# Patient Record
Sex: FEMALE
[De-identification: names, ages, dates, MRNs, and addresses within clinical notes are randomized per-mention and may not be internally consistent; named-entity substitution may affect disease eponyms.]

---

## 2018-01-22 ENCOUNTER — APPOINTMENT (OUTPATIENT)
Dept: GERIATRICS | Facility: CLINIC | Age: 83
End: 2018-01-22
Payer: MEDICARE

## 2018-01-22 VITALS
DIASTOLIC BLOOD PRESSURE: 70 MMHG | OXYGEN SATURATION: 98 % | RESPIRATION RATE: 18 BRPM | HEART RATE: 66 BPM | SYSTOLIC BLOOD PRESSURE: 112 MMHG | HEIGHT: 60 IN | BODY MASS INDEX: 26.5 KG/M2 | WEIGHT: 135 LBS

## 2018-01-22 DIAGNOSIS — S62.101A FRACTURE OF UNSPECIFIED CARPAL BONE, RIGHT WRIST, INITIAL ENCOUNTER FOR CLOSED FRACTURE: ICD-10-CM

## 2018-01-22 DIAGNOSIS — Z87.81 PERSONAL HISTORY OF (HEALED) TRAUMATIC FRACTURE: ICD-10-CM

## 2018-01-22 DIAGNOSIS — Z74.1 NEED FOR ASSISTANCE WITH PERSONAL CARE: ICD-10-CM

## 2018-01-22 DIAGNOSIS — Z63.4 DISAPPEARANCE AND DEATH OF FAMILY MEMBER: ICD-10-CM

## 2018-01-22 PROCEDURE — 99204 OFFICE O/P NEW MOD 45 MIN: CPT

## 2018-01-22 RX ORDER — ASPIRIN ENTERIC COATED TABLETS 81 MG 81 MG/1
81 TABLET, DELAYED RELEASE ORAL DAILY
Qty: 30 | Refills: 11 | Status: ACTIVE | COMMUNITY
Start: 2018-01-22

## 2018-01-22 RX ORDER — MULTIVIT-MIN/FA/LYCOPEN/LUTEIN .4-300-25
TABLET ORAL DAILY
Qty: 30 | Refills: 3 | Status: DISCONTINUED | COMMUNITY
Start: 2018-01-22 | End: 2018-01-22

## 2018-01-22 SDOH — SOCIAL STABILITY - SOCIAL INSECURITY: DISSAPEARANCE AND DEATH OF FAMILY MEMBER: Z63.4

## 2018-02-16 ENCOUNTER — APPOINTMENT (OUTPATIENT)
Dept: GERIATRICS | Facility: CLINIC | Age: 83
End: 2018-02-16
Payer: MEDICARE

## 2018-02-16 VITALS
SYSTOLIC BLOOD PRESSURE: 110 MMHG | DIASTOLIC BLOOD PRESSURE: 60 MMHG | BODY MASS INDEX: 21.6 KG/M2 | OXYGEN SATURATION: 97 % | WEIGHT: 110 LBS | RESPIRATION RATE: 18 BRPM | TEMPERATURE: 98.1 F | HEIGHT: 60 IN | HEART RATE: 74 BPM

## 2018-02-16 PROCEDURE — 99214 OFFICE O/P EST MOD 30 MIN: CPT

## 2018-02-20 LAB
ALBUMIN SERPL ELPH-MCNC: 3.7 G/DL
ALP BLD-CCNC: 125 U/L
ALT SERPL-CCNC: 13 U/L
ANION GAP SERPL CALC-SCNC: 13 MMOL/L
AST SERPL-CCNC: 22 U/L
BASOPHILS # BLD AUTO: 0.02 K/UL
BASOPHILS NFR BLD AUTO: 0.3 %
BILIRUB SERPL-MCNC: 0.2 MG/DL
BUN SERPL-MCNC: 13 MG/DL
CALCIUM SERPL-MCNC: 9.3 MG/DL
CHLORIDE SERPL-SCNC: 93 MMOL/L
CO2 SERPL-SCNC: 29 MMOL/L
CREAT SERPL-MCNC: 0.65 MG/DL
EOSINOPHIL # BLD AUTO: 0.08 K/UL
EOSINOPHIL NFR BLD AUTO: 1 %
GLUCOSE SERPL-MCNC: 93 MG/DL
HCT VFR BLD CALC: 35.3 %
HGB BLD-MCNC: 11.2 G/DL
IMM GRANULOCYTES NFR BLD AUTO: 0.4 %
LYMPHOCYTES # BLD AUTO: 2.6 K/UL
LYMPHOCYTES NFR BLD AUTO: 33 %
MAN DIFF?: NORMAL
MCHC RBC-ENTMCNC: 29.6 PG
MCHC RBC-ENTMCNC: 31.7 GM/DL
MCV RBC AUTO: 93.1 FL
MONOCYTES # BLD AUTO: 0.47 K/UL
MONOCYTES NFR BLD AUTO: 6 %
NEUTROPHILS # BLD AUTO: 4.67 K/UL
NEUTROPHILS NFR BLD AUTO: 59.3 %
PLATELET # BLD AUTO: 432 K/UL
POTASSIUM SERPL-SCNC: 4.2 MMOL/L
PROT SERPL-MCNC: 7.1 G/DL
RBC # BLD: 3.79 M/UL
RBC # FLD: 14.9 %
SODIUM SERPL-SCNC: 135 MMOL/L
WBC # FLD AUTO: 7.87 K/UL

## 2018-03-01 ENCOUNTER — OTHER (OUTPATIENT)
Age: 83
End: 2018-03-01

## 2018-04-19 ENCOUNTER — APPOINTMENT (OUTPATIENT)
Dept: GERIATRICS | Facility: CLINIC | Age: 83
End: 2018-04-19
Payer: MEDICARE

## 2018-04-19 VITALS
OXYGEN SATURATION: 94 % | WEIGHT: 112 LBS | SYSTOLIC BLOOD PRESSURE: 130 MMHG | TEMPERATURE: 99.1 F | HEART RATE: 77 BPM | DIASTOLIC BLOOD PRESSURE: 66 MMHG | BODY MASS INDEX: 21.87 KG/M2 | RESPIRATION RATE: 20 BRPM

## 2018-04-19 PROCEDURE — 99214 OFFICE O/P EST MOD 30 MIN: CPT

## 2018-04-19 RX ORDER — FAMOTIDINE 20 MG/1
20 TABLET, FILM COATED ORAL
Qty: 90 | Refills: 3 | Status: DISCONTINUED | COMMUNITY
Start: 2018-01-22 | End: 2018-04-19

## 2018-04-19 RX ORDER — DOCUSATE SODIUM 100 MG
100 TABLET ORAL DAILY
Qty: 60 | Refills: 0 | Status: DISCONTINUED | COMMUNITY
Start: 2018-01-22 | End: 2018-04-19

## 2018-04-19 RX ORDER — SENNOSIDES 8.6 MG TABLETS 8.6 MG/1
8.6 TABLET ORAL
Qty: 60 | Refills: 3 | Status: DISCONTINUED | COMMUNITY
Start: 2018-01-22 | End: 2018-04-19

## 2018-06-18 ENCOUNTER — APPOINTMENT (OUTPATIENT)
Dept: GERIATRICS | Facility: CLINIC | Age: 83
End: 2018-06-18
Payer: MEDICARE

## 2018-06-18 VITALS
OXYGEN SATURATION: 97 % | HEART RATE: 83 BPM | BODY MASS INDEX: 26.01 KG/M2 | TEMPERATURE: 98.3 F | DIASTOLIC BLOOD PRESSURE: 70 MMHG | SYSTOLIC BLOOD PRESSURE: 124 MMHG | WEIGHT: 133.2 LBS

## 2018-06-18 PROCEDURE — 99214 OFFICE O/P EST MOD 30 MIN: CPT

## 2018-06-18 RX ORDER — BENZONATATE 100 MG/1
100 CAPSULE ORAL
Qty: 30 | Refills: 0 | Status: DISCONTINUED | COMMUNITY
Start: 2018-04-19 | End: 2018-06-18

## 2018-06-18 RX ORDER — CEFUROXIME AXETIL 500 MG/1
500 TABLET ORAL
Qty: 14 | Refills: 0 | Status: DISCONTINUED | COMMUNITY
Start: 2018-04-19 | End: 2018-06-18

## 2018-06-18 RX ORDER — ALBUTEROL SULFATE 2.5 MG/3ML
(2.5 MG/3ML) SOLUTION RESPIRATORY (INHALATION)
Qty: 120 | Refills: 2 | Status: DISCONTINUED | COMMUNITY
Start: 2018-04-19 | End: 2018-06-18

## 2018-06-22 ENCOUNTER — RX RENEWAL (OUTPATIENT)
Age: 83
End: 2018-06-22

## 2018-07-30 ENCOUNTER — APPOINTMENT (OUTPATIENT)
Dept: ORTHOPEDIC SURGERY | Facility: CLINIC | Age: 83
End: 2018-07-30

## 2018-08-01 ENCOUNTER — RX RENEWAL (OUTPATIENT)
Age: 83
End: 2018-08-01

## 2018-08-06 ENCOUNTER — APPOINTMENT (OUTPATIENT)
Dept: ORTHOPEDIC SURGERY | Facility: CLINIC | Age: 83
End: 2018-08-06
Payer: MEDICARE

## 2018-08-06 DIAGNOSIS — M19.011 PRIMARY OSTEOARTHRITIS, RIGHT SHOULDER: ICD-10-CM

## 2018-08-06 PROCEDURE — 99204 OFFICE O/P NEW MOD 45 MIN: CPT | Mod: 25

## 2018-08-06 PROCEDURE — 73030 X-RAY EXAM OF SHOULDER: CPT | Mod: 50

## 2018-08-06 PROCEDURE — 20610 DRAIN/INJ JOINT/BURSA W/O US: CPT | Mod: 50

## 2018-08-06 PROCEDURE — 73560 X-RAY EXAM OF KNEE 1 OR 2: CPT | Mod: 50

## 2018-09-20 ENCOUNTER — APPOINTMENT (OUTPATIENT)
Dept: GERIATRICS | Facility: CLINIC | Age: 83
End: 2018-09-20

## 2018-11-05 ENCOUNTER — RX RENEWAL (OUTPATIENT)
Age: 83
End: 2018-11-05

## 2019-05-31 ENCOUNTER — RX RENEWAL (OUTPATIENT)
Age: 84
End: 2019-05-31

## 2019-06-14 ENCOUNTER — APPOINTMENT (OUTPATIENT)
Dept: GERIATRICS | Facility: CLINIC | Age: 84
End: 2019-06-14

## 2019-06-21 ENCOUNTER — APPOINTMENT (OUTPATIENT)
Dept: GERIATRICS | Facility: CLINIC | Age: 84
End: 2019-06-21
Payer: MEDICARE

## 2019-06-21 VITALS
TEMPERATURE: 98.4 F | HEIGHT: 60 IN | WEIGHT: 128.5 LBS | RESPIRATION RATE: 14 BRPM | DIASTOLIC BLOOD PRESSURE: 60 MMHG | BODY MASS INDEX: 25.23 KG/M2 | HEART RATE: 66 BPM | OXYGEN SATURATION: 96 % | SYSTOLIC BLOOD PRESSURE: 120 MMHG

## 2019-06-21 DIAGNOSIS — M81.0 AGE-RELATED OSTEOPOROSIS W/OUT CURRENT PATHOLOGICAL FRACTURE: ICD-10-CM

## 2019-06-21 PROCEDURE — 99495 TRANSJ CARE MGMT MOD F2F 14D: CPT

## 2019-06-21 RX ORDER — VALSARTAN 160 MG/1
160 TABLET, COATED ORAL DAILY
Qty: 90 | Refills: 0 | Status: DISCONTINUED | COMMUNITY
Start: 2018-01-22 | End: 2019-06-21

## 2019-06-21 RX ORDER — MEMANTINE HYDROCHLORIDE 5 MG/1
5 TABLET, FILM COATED ORAL
Qty: 180 | Refills: 1 | Status: DISCONTINUED | COMMUNITY
Start: 2018-06-18 | End: 2019-06-21

## 2019-06-21 RX ORDER — DONEPEZIL HYDROCHLORIDE 10 MG/1
10 TABLET ORAL DAILY
Qty: 30 | Refills: 5 | Status: DISCONTINUED | COMMUNITY
Start: 2018-01-22 | End: 2019-06-21

## 2019-06-21 RX ORDER — HYDROCHLOROTHIAZIDE 25 MG/1
25 TABLET ORAL DAILY
Qty: 90 | Refills: 1 | Status: DISCONTINUED | COMMUNITY
Start: 2018-01-22 | End: 2019-06-21

## 2019-06-21 RX ORDER — DICLOFENAC SODIUM 10 MG/G
1 GEL TOPICAL
Qty: 1 | Refills: 1 | Status: DISCONTINUED | COMMUNITY
Start: 2018-06-18 | End: 2019-06-21

## 2019-06-21 NOTE — ASSESSMENT
[FreeTextEntry1] : UTI: post hospital discharge and doing well.  completed course of levofloxacin.\par htn: controlled, medications reconciled with discharge summary:  now on losartan and metoprolol 12.5mg bid.\par dementia: ast mmse 8/30. will monitor off medications.  dtr reports mood is better. c/w [facility]  [facility] and 24/7 care\par unsteady gait: fall precautions\par uI: c/w supportive care.\par OP: fall precautions. c/w vitamin D supplementation\par

## 2019-06-21 NOTE — PHYSICAL EXAM
[General Appearance - Alert] : alert [Sclera] : the sclera and conjunctiva were normal [Extraocular Movements] : extraocular movements were intact [No Oral Pallor] : no oral pallor [Examination Of The Oral Cavity] : the lips and gums were normal [Neck Appearance] : the appearance of the neck was normal [Respiration, Rhythm And Depth] : normal respiratory rhythm and effort [Exaggerated Use Of Accessory Muscles For Inspiration] : no accessory muscle use [Heart Rate And Rhythm] : heart rate was normal and rhythm regular [Auscultation Breath Sounds / Voice Sounds] : lungs were clear to auscultation bilaterally [Heart Sounds] : normal S1 and S2 [Edema] : there was no peripheral edema [Abdomen Tenderness] : non-tender [Bowel Sounds] : normal bowel sounds [Cervical Lymph Nodes Enlarged Posterior Bilaterally] : posterior cervical [Cervical Lymph Nodes Enlarged Anterior Bilaterally] : anterior cervical [Supraclavicular Lymph Nodes Enlarged Bilaterally] : supraclavicular [No Spinal Tenderness] : no spinal tenderness [Skin Turgor] : normal skin turgor [Skin Lesions] : no skin lesions [] : no rash [No Focal Deficits] : no focal deficits [Affect] : the affect was normal [Mood] : the mood was normal [FreeTextEntry1] : right shoulder with slight swelling, tender to palpation

## 2019-06-21 NOTE — HISTORY OF PRESENT ILLNESS
[0] : 2) Feeling down, depressed, or hopeless: Not at all [FreeTextEntry1] : 89yoF with HTN, dementia, GERD, Osteoporosis, constipation,who is seen with her dtr Aisha for a follow up visit after she was admitted 6/12-/6/14 duet o UTI, treated with cefepime, then transitioned to levofloxacin.  discharged with home care. I spoke with dtr and visiting nurse about patients care over the phone prior to the visit today.\par \par had zio monitor placed while in hospital and will have follow up with cardiology.\par \par dtr reports patient has been doing well since hospital discharge.\par \par still eating mostly pureed food, as she doesn't always use dentures.\par \par Denies diarrhea or ab pain.\par \par dementia: has hha 7 days a week.  no falls.  No agitation or aggression.  was not taking Aricept or Namenda during hospitalization, and dtr has not restarted.  denies any change in memory since stopping the medications.

## 2019-07-12 ENCOUNTER — RX RENEWAL (OUTPATIENT)
Age: 84
End: 2019-07-12

## 2019-10-08 ENCOUNTER — APPOINTMENT (OUTPATIENT)
Dept: GERIATRICS | Facility: CLINIC | Age: 84
End: 2019-10-08
Payer: MEDICARE

## 2019-10-08 VITALS
DIASTOLIC BLOOD PRESSURE: 60 MMHG | HEIGHT: 62 IN | TEMPERATURE: 99.2 F | WEIGHT: 123.13 LBS | SYSTOLIC BLOOD PRESSURE: 90 MMHG | BODY MASS INDEX: 22.66 KG/M2 | OXYGEN SATURATION: 96 % | RESPIRATION RATE: 15 BRPM | HEART RATE: 68 BPM

## 2019-10-08 DIAGNOSIS — M25.411 EFFUSION, RIGHT SHOULDER: ICD-10-CM

## 2019-10-08 DIAGNOSIS — M25.511 PAIN IN RIGHT SHOULDER: ICD-10-CM

## 2019-10-08 DIAGNOSIS — J20.8 ACUTE BRONCHITIS DUE TO OTHER SPECIFIED ORGANISMS: ICD-10-CM

## 2019-10-08 DIAGNOSIS — Z13.1 ENCOUNTER FOR SCREENING FOR DIABETES MELLITUS: ICD-10-CM

## 2019-10-08 DIAGNOSIS — Z13.29 ENCOUNTER FOR SCREENING FOR OTHER SUSPECTED ENDOCRINE DISORDER: ICD-10-CM

## 2019-10-08 DIAGNOSIS — Z13.21 ENCOUNTER FOR SCREENING FOR NUTRITIONAL DISORDER: ICD-10-CM

## 2019-10-08 DIAGNOSIS — J45.909 UNSPECIFIED ASTHMA, UNCOMPLICATED: ICD-10-CM

## 2019-10-08 DIAGNOSIS — B96.89 ACUTE BRONCHITIS DUE TO OTHER SPECIFIED ORGANISMS: ICD-10-CM

## 2019-10-08 PROCEDURE — 99215 OFFICE O/P EST HI 40 MIN: CPT

## 2019-10-10 ENCOUNTER — LABORATORY RESULT (OUTPATIENT)
Age: 84
End: 2019-10-10

## 2019-10-11 LAB
APPEARANCE: ABNORMAL
BILIRUBIN URINE: NEGATIVE
BLOOD URINE: ABNORMAL
COLOR: ABNORMAL
GLUCOSE QUALITATIVE U: NEGATIVE
KETONES URINE: NEGATIVE
LEUKOCYTE ESTERASE URINE: ABNORMAL
NITRITE URINE: NEGATIVE
PH URINE: 6
PROTEIN URINE: ABNORMAL
SPECIFIC GRAVITY URINE: 1.02
UROBILINOGEN URINE: NORMAL

## 2019-10-13 LAB
25(OH)D3 SERPL-MCNC: 42.6 NG/ML
ALBUMIN SERPL ELPH-MCNC: 3.6 G/DL
ALP BLD-CCNC: 150 U/L
ALT SERPL-CCNC: 44 U/L
ANION GAP SERPL CALC-SCNC: 15 MMOL/L
AST SERPL-CCNC: 70 U/L
BASOPHILS # BLD AUTO: 0.03 K/UL
BASOPHILS NFR BLD AUTO: 0.4 %
BILIRUB SERPL-MCNC: 0.3 MG/DL
BUN SERPL-MCNC: 31 MG/DL
CALCIUM SERPL-MCNC: 9.1 MG/DL
CHLORIDE SERPL-SCNC: 104 MMOL/L
CHOLEST SERPL-MCNC: 156 MG/DL
CHOLEST/HDLC SERPL: 5.8 RATIO
CO2 SERPL-SCNC: 23 MMOL/L
CREAT SERPL-MCNC: 1.04 MG/DL
EOSINOPHIL # BLD AUTO: 0.1 K/UL
EOSINOPHIL NFR BLD AUTO: 1.3 %
ESTIMATED AVERAGE GLUCOSE: 114 MG/DL
FOLATE SERPL-MCNC: 12.6 NG/ML
GLUCOSE SERPL-MCNC: 146 MG/DL
HBA1C MFR BLD HPLC: 5.6 %
HCT VFR BLD CALC: 38.9 %
HDLC SERPL-MCNC: 27 MG/DL
HGB BLD-MCNC: 11.9 G/DL
IMM GRANULOCYTES NFR BLD AUTO: 1.2 %
LDLC SERPL CALC-MCNC: 76 MG/DL
LYMPHOCYTES # BLD AUTO: 1.33 K/UL
LYMPHOCYTES NFR BLD AUTO: 17.2 %
MAN DIFF?: NORMAL
MCHC RBC-ENTMCNC: 29.5 PG
MCHC RBC-ENTMCNC: 30.6 GM/DL
MCV RBC AUTO: 96.5 FL
MONOCYTES # BLD AUTO: 0.48 K/UL
MONOCYTES NFR BLD AUTO: 6.2 %
NEUTROPHILS # BLD AUTO: 5.71 K/UL
NEUTROPHILS NFR BLD AUTO: 73.7 %
PLATELET # BLD AUTO: 272 K/UL
POTASSIUM SERPL-SCNC: 4.1 MMOL/L
PROT SERPL-MCNC: 6.9 G/DL
RBC # BLD: 4.03 M/UL
RBC # FLD: 13.5 %
SODIUM SERPL-SCNC: 142 MMOL/L
TRIGL SERPL-MCNC: 264 MG/DL
TSH SERPL-ACNC: 3.24 UIU/ML
VIT B12 SERPL-MCNC: 163 PG/ML
WBC # FLD AUTO: 7.74 K/UL

## 2019-10-14 ENCOUNTER — APPOINTMENT (OUTPATIENT)
Dept: GERIATRICS | Facility: CLINIC | Age: 84
End: 2019-10-14
Payer: MEDICARE

## 2019-10-14 VITALS
WEIGHT: 122.19 LBS | DIASTOLIC BLOOD PRESSURE: 70 MMHG | HEIGHT: 62 IN | RESPIRATION RATE: 15 BRPM | OXYGEN SATURATION: 98 % | SYSTOLIC BLOOD PRESSURE: 120 MMHG | TEMPERATURE: 97.6 F | HEART RATE: 71 BPM | BODY MASS INDEX: 22.48 KG/M2

## 2019-10-14 PROCEDURE — 99214 OFFICE O/P EST MOD 30 MIN: CPT

## 2019-10-14 RX ORDER — METOPROLOL TARTRATE 25 MG/1
25 TABLET, FILM COATED ORAL
Qty: 60 | Refills: 4 | Status: DISCONTINUED | COMMUNITY
Start: 2019-06-21 | End: 2019-10-14

## 2019-10-14 NOTE — PHYSICAL EXAM
[General Appearance - In No Acute Distress] : in no acute distress [Sclera] : the sclera and conjunctiva were normal [General Appearance - Alert] : alert [Extraocular Movements] : extraocular movements were intact [] : no respiratory distress [Neck Appearance] : the appearance of the neck was normal [No Oral Cyanosis] : no oral cyanosis [Exaggerated Use Of Accessory Muscles For Inspiration] : no accessory muscle use [Auscultation Breath Sounds / Voice Sounds] : lungs were clear to auscultation bilaterally [Respiration, Rhythm And Depth] : normal respiratory rhythm and effort [Heart Rate And Rhythm] : heart rate was normal and rhythm regular [Heart Sounds] : normal S1 and S2 [Edema] : there was no peripheral edema [Bowel Sounds] : normal bowel sounds [Abdomen Tenderness] : non-tender [Abdomen Soft] : soft [Nail Clubbing] : no clubbing  or cyanosis of the fingernails [Involuntary Movements] : no involuntary movements were seen

## 2019-10-14 NOTE — HISTORY OF PRESENT ILLNESS
[FreeTextEntry1] : 89yoF seen for follow up with ruth Leonard.\par \par uti:\par started nitrofurantion yesterday.  tolerating well.\par denies worsening in symptoms.\par denies complaints of suprapubic pain/ab pain.\par \par htn:\par kunal gave losartan today along with metoprolol.  BP controlled.\par home BP readings have also been stable.  systolic ~140's.\par \par dementia:\par returning to baseline with mental status.\par eating okay

## 2019-10-14 NOTE — ASSESSMENT
[FreeTextEntry1] : uti:  complete course of nitrofurantion\par monitor UI\par htn:  c/w losartan, change metoprolol to metoprolol succinate 25mg daily for difficulty with bid dosing.  c/w home bp readings.\par vit b12 def: advised to c/w b12 supplement\par

## 2019-12-30 ENCOUNTER — APPOINTMENT (OUTPATIENT)
Dept: GERIATRICS | Facility: CLINIC | Age: 84
End: 2019-12-30
Payer: MEDICARE

## 2019-12-30 VITALS
WEIGHT: 126.2 LBS | SYSTOLIC BLOOD PRESSURE: 120 MMHG | HEIGHT: 62 IN | BODY MASS INDEX: 23.22 KG/M2 | TEMPERATURE: 98.8 F | DIASTOLIC BLOOD PRESSURE: 80 MMHG | HEART RATE: 76 BPM | RESPIRATION RATE: 15 BRPM | OXYGEN SATURATION: 97 %

## 2019-12-30 PROCEDURE — 99214 OFFICE O/P EST MOD 30 MIN: CPT | Mod: GC

## 2019-12-30 RX ORDER — LOSARTAN POTASSIUM 100 MG/1
100 TABLET, FILM COATED ORAL DAILY
Qty: 30 | Refills: 5 | Status: DISCONTINUED | COMMUNITY
Start: 2019-06-21 | End: 2019-12-30

## 2019-12-30 RX ORDER — L.ACIDOPH/L.BULG/B.BIF/S.THERM 1B-250 MG
TABLET ORAL
Qty: 20 | Refills: 0 | Status: DISCONTINUED | COMMUNITY
Start: 2019-10-11 | End: 2019-12-30

## 2019-12-30 RX ORDER — NITROFURANTOIN (MONOHYDRATE/MACROCRYSTALS) 25; 75 MG/1; MG/1
100 CAPSULE ORAL TWICE DAILY
Qty: 10 | Refills: 0 | Status: DISCONTINUED | COMMUNITY
Start: 2019-10-11 | End: 2019-12-30

## 2019-12-30 NOTE — PHYSICAL EXAM
[General Appearance - Well Nourished] : well nourished [General Appearance - Well Developed] : well developed [General Appearance - Well-Appearing] : healthy appearing [Respiration, Rhythm And Depth] : normal respiratory rhythm and effort [Auscultation Breath Sounds / Voice Sounds] : lungs were clear to auscultation bilaterally [Heart Rate And Rhythm] : heart rate was normal and rhythm regular [Heart Sounds] : normal S1 and S2 [Murmurs] : no murmurs [Bowel Sounds] : normal bowel sounds [Abdomen Soft] : soft [Extraocular Movements] : extraocular movements were intact [Sclera] : the sclera and conjunctiva were normal [Neck Appearance] : the appearance of the neck was normal [No Oral Cyanosis] : no oral cyanosis [] : no respiratory distress [Exaggerated Use Of Accessory Muscles For Inspiration] : no accessory muscle use [No Focal Deficits] : no focal deficits [Affect] : the affect was normal [Mood] : the mood was normal [FreeTextEntry1] : scattered ecchymoses on L arm from blood draws

## 2019-12-30 NOTE — REASON FOR VISIT
[Family Member] : family member [Post Hospitalization] : a post hospitalization visit [FreeTextEntry1] : hospital admission for urinary tract infection

## 2019-12-30 NOTE — HISTORY OF PRESENT ILLNESS
[FreeTextEntry1] : 89F with dementia, HTN, GERD, osteoporosis, OA presenting for follow up after hospitalization for UTI.\par \par Patient was hospitalized from 12/17-12/23. Patient was unable to walk, very weak. Usually able to walk from bed to bathroom and was having a hard time, she wasn't herself. BP was taken at home and was elevated ~170s. She was also complaining of chills at home. She wears diapers at bedtime and daughter noted increased wetness, more than normal. Also noted with increased odor to urine and therefore was taken to hospital. She was in the ICU during the admission because her blood pressure was low. Per paperwork, patient was hospitalized with septic shock. Per daughter, she is unsure if she had bacteria in the blood however she was not discharged with antibiotics. On discharge, her valsartan was decreased to 80mg. \par \par Since discharge, patient still remains somewhat weak, has had constipation and has had left shoulder pain which has worsened - has not tried any meds at home. No falls, no recurrent fevers, has been eating and drinking normally. Patient was given 1 senna at home without help for constipation. Daughter unsure of when last BM was. Daughter states she continues to have wet diapers in the AM which is somewhat abnormal for her. Patient states she is a little sad today because she doesn't know what she has and thinks she has something wrong with her throat. She states she currently does not have pain, phlegm, trouble swallowing but is afraid she might catch something. Patient denies urinary frequency, dysuria, abn odor to her urine. PT visited to home today.\par \par Patient currently knows where she is, does not know date.

## 2019-12-30 NOTE — REVIEW OF SYSTEMS
[Constipation] : constipation [Nasal Discharge] : no nasal discharge [Chills] : no chills [Fever] : no fever [Sore Throat] : no sore throat [Chest Pain] : no chest pain [Shortness Of Breath] : no shortness of breath [Cough] : no cough [Palpitations] : no palpitations [Abdominal Pain] : no abdominal pain [Dysuria] : no dysuria

## 2019-12-30 NOTE — ASSESSMENT
[FreeTextEntry1] : #Recurrent UTI\par - currently without symptoms\par - discussed with daughter no need to intermittent test urine if not having any symptoms\par - if patient has another UTI (hospitalized over the summer and this month) will refer to urology\par \par \par #Constipation\par - tried 1 senna without much relief\par - will try 2 senna at night tonight and miralax daily to start tomorrow to help relieve constipation\par - if no relief, advised on trying glycerine suppository\par - advised if severe abd pain, N/V will need to be evaluated in ED\par \par #Shoulder pain \par - chronic, slightly worsened since last visit, has not tried any medication\par - advised to try 2 extra strength tylenol and assess for relief\par - receiving PT at home\par \par #htn:\par -controlled\par c/w valsartan 80mg daily and metoprolol 25mg qd\par \par #vit D def:\par d/c vitamin D\par may need to repeat level with next routine bloodwork\par

## 2020-04-09 ENCOUNTER — APPOINTMENT (OUTPATIENT)
Dept: GERIATRICS | Facility: CLINIC | Age: 85
End: 2020-04-09
Payer: MEDICARE

## 2020-04-09 ENCOUNTER — TRANSCRIPTION ENCOUNTER (OUTPATIENT)
Age: 85
End: 2020-04-09

## 2020-04-09 DIAGNOSIS — K59.00 CONSTIPATION, UNSPECIFIED: ICD-10-CM

## 2020-04-09 DIAGNOSIS — N30.00 ACUTE CYSTITIS W/OUT HEMATURIA: ICD-10-CM

## 2020-04-09 DIAGNOSIS — N39.0 URINARY TRACT INFECTION, SITE NOT SPECIFIED: ICD-10-CM

## 2020-04-09 DIAGNOSIS — J06.9 ACUTE UPPER RESPIRATORY INFECTION, UNSPECIFIED: ICD-10-CM

## 2020-04-09 PROCEDURE — 99214 OFFICE O/P EST MOD 30 MIN: CPT | Mod: 95

## 2020-04-30 ENCOUNTER — RX RENEWAL (OUTPATIENT)
Age: 85
End: 2020-04-30

## 2020-06-04 ENCOUNTER — APPOINTMENT (OUTPATIENT)
Dept: GERIATRICS | Facility: CLINIC | Age: 85
End: 2020-06-04
Payer: MEDICARE

## 2020-06-04 PROCEDURE — 99214 OFFICE O/P EST MOD 30 MIN: CPT | Mod: 95

## 2020-06-04 NOTE — REVIEW OF SYSTEMS
[Eyesight Problems] : eyesight problems [Loss Of Hearing] : hearing loss [Incontinence] : incontinence [Negative] : Heme/Lymph [Joint Pain] : no joint pain [Sleep Disturbances] : no sleep disturbances [FreeTextEntry7] : occasional diarrhea

## 2020-06-04 NOTE — HISTORY OF PRESENT ILLNESS
[Severe] : Stage: Severe [Worse] : Status: Worse [Memory Lapses Or Loss] : worsened memory impairment [Patient Observed To Be Agitated] : worsened agitation [Sleep Disturbances] : denies sleep disturbances [] : denies wandering [Fixed Beliefs Contradicted By Reality (Delusions)] : denies delusions [Difficulty Finding Desired Words] : denies difficulty finding desired words [Home] : at home, [unfilled] , at the time of the visit. [Medical Office: ([facility])___] : at the medical office located in  [de-identified] : no longer recognizes family, denies dysphagia [FreeTextEntry1] : 89F with dementia, HTN, GERD, osteoporosis, OA presenting for follow up with her dtr via telehealth.\par \par dtr denies any acute problems aside from worsening of her memory.\par \par they no longer have help from previous aide due to coronavirus pandemic.  care has been with family.  needing forms completed.\par \par htn: denies lightheadedness, dizziness, shortness of breath, or falls. adherent with medications\par  \par UI:  no bleeding.  wears diapers\par also with fecal incontinence\par  [FreeTextEntry3] : JAYLENE Leonard

## 2020-06-04 NOTE — PHYSICAL EXAM
[General Appearance - Alert] : alert [General Appearance - In No Acute Distress] : in no acute distress [Sclera] : the sclera and conjunctiva were normal [Extraocular Movements] : extraocular movements were intact [Neck Appearance] : the appearance of the neck was normal [] : no respiratory distress [Respiration, Rhythm And Depth] : normal respiratory rhythm and effort [Edema] : there was no peripheral edema [Abdomen Soft] : soft [Exaggerated Use Of Accessory Muscles For Inspiration] : no accessory muscle use [Abdomen Tenderness] : non-tender [Nail Clubbing] : no clubbing  or cyanosis of the fingernails [Involuntary Movements] : no involuntary movements were seen [Skin Color & Pigmentation] : normal skin color and pigmentation [No Focal Deficits] : no focal deficits [FreeTextEntry1] : calm, quiet

## 2020-07-10 ENCOUNTER — RX RENEWAL (OUTPATIENT)
Age: 85
End: 2020-07-10

## 2020-07-13 DIAGNOSIS — Z86.39 PERSONAL HISTORY OF OTHER ENDOCRINE, NUTRITIONAL AND METABOLIC DISEASE: ICD-10-CM

## 2020-07-22 ENCOUNTER — RX RENEWAL (OUTPATIENT)
Age: 85
End: 2020-07-22

## 2020-10-09 ENCOUNTER — APPOINTMENT (OUTPATIENT)
Dept: GERIATRICS | Facility: CLINIC | Age: 85
End: 2020-10-09
Payer: MEDICARE

## 2020-10-09 PROCEDURE — 99213 OFFICE O/P EST LOW 20 MIN: CPT | Mod: 95

## 2020-10-09 RX ORDER — PANTOPRAZOLE 20 MG/1
20 TABLET, DELAYED RELEASE ORAL
Qty: 30 | Refills: 3 | Status: ACTIVE | COMMUNITY
Start: 2020-06-04 | End: 1900-01-01

## 2020-10-12 NOTE — HISTORY OF PRESENT ILLNESS
[FreeTextEntry1] : 89F with dementia, HTN, GERD, osteoporosis, OA presenting for acute visit with her dtr via telehealth.\par \par new rash over b/l groin/inner thighs for a few weeks.  small rash on buttocks as well.  denies pain or bleeding or discharge.  \par UI:  no bleeding.  wears diapers\par also with fecal incontinence\par \par has been using cortisone otc, antibacterial ointment but without relief.  \par  [Home] : at home, [unfilled] , at the time of the visit. [Medical Office: ([facility])___] : at the medical office located in  [FreeTextEntry3] : ruth Leonard

## 2020-10-29 ENCOUNTER — APPOINTMENT (OUTPATIENT)
Dept: GERIATRICS | Facility: CLINIC | Age: 85
End: 2020-10-29
Payer: MEDICARE

## 2020-10-29 VITALS
TEMPERATURE: 98.2 F | HEART RATE: 63 BPM | BODY MASS INDEX: 23.42 KG/M2 | WEIGHT: 127.25 LBS | SYSTOLIC BLOOD PRESSURE: 100 MMHG | OXYGEN SATURATION: 97 % | HEIGHT: 62 IN | RESPIRATION RATE: 16 BRPM | DIASTOLIC BLOOD PRESSURE: 70 MMHG

## 2020-10-29 DIAGNOSIS — Z23 ENCOUNTER FOR IMMUNIZATION: ICD-10-CM

## 2020-10-29 DIAGNOSIS — R21 RASH AND OTHER NONSPECIFIC SKIN ERUPTION: ICD-10-CM

## 2020-10-29 DIAGNOSIS — M25.512 PAIN IN LEFT SHOULDER: ICD-10-CM

## 2020-10-29 DIAGNOSIS — E53.8 DEFICIENCY OF OTHER SPECIFIED B GROUP VITAMINS: ICD-10-CM

## 2020-10-29 PROCEDURE — 99072 ADDL SUPL MATRL&STAF TM PHE: CPT

## 2020-10-29 PROCEDURE — G0008: CPT

## 2020-10-29 PROCEDURE — 90662 IIV NO PRSV INCREASED AG IM: CPT

## 2020-10-29 PROCEDURE — 99214 OFFICE O/P EST MOD 30 MIN: CPT | Mod: 25

## 2020-10-29 RX ORDER — NYSTATIN AND TRIAMCINOLONE ACETONIDE 100000; 1 MG/G; MG/G
100000-0.1 CREAM TOPICAL
Qty: 1 | Refills: 1 | Status: DISCONTINUED | COMMUNITY
Start: 2020-10-09 | End: 2020-10-29

## 2020-10-29 RX ORDER — ACETAMINOPHEN 500 MG/1
500 TABLET, COATED ORAL EVERY 8 HOURS
Qty: 30 | Refills: 3 | Status: ACTIVE | COMMUNITY
Start: 2020-10-29 | End: 1900-01-01

## 2020-10-29 NOTE — REASON FOR VISIT
[Acute] : an acute visit [Family Member] : family member [FreeTextEntry1] : groin rash and shoulder pain

## 2020-10-29 NOTE — REVIEW OF SYSTEMS
[Feeling Tired] : feeling tired [Loss Of Hearing] : hearing loss [Cough] : cough [Incontinence] : incontinence [Arthralgias] : arthralgias [Joint Pain] : joint pain [Joint Stiffness] : joint stiffness [Skin Lesions] : skin lesion [Itching] : itching [Confused] : confusion [Sleep Disturbances] : sleep disturbances [Fever] : no fever [Chills] : no chills [Feeling Poorly] : not feeling poorly [Eye Pain] : no eye pain [Red Eyes] : eyes not red [Eyesight Problems] : no eyesight problems [Discharge From Eyes] : no purulent discharge from the eyes [Earache] : no earache [Nosebleeds] : no nosebleeds [Nasal Discharge] : no nasal discharge [Heart Rate Is Slow] : the heart rate was not slow [Heart Rate Is Fast] : the heart rate was not fast [Chest Pain] : no chest pain [Palpitations] : no palpitations [Shortness Of Breath] : no shortness of breath [Wheezing] : no wheezing [SOB on Exertion] : no shortness of breath during exertion [Abdominal Pain] : no abdominal pain [Vomiting] : no vomiting [Constipation] : no constipation [Diarrhea] : no diarrhea [Dysuria] : no dysuria [Pelvic Pain] : no pelvic pain [Joint Swelling] : no joint swelling [Skin Wound] : no skin wound [Change In A Mole] : no change in a mole [Convulsions] : no convulsions [Dizziness] : no dizziness [Fainting] : no fainting [FreeTextEntry9] : bilateral shoulders [de-identified] : groin itching and redness

## 2020-10-29 NOTE — PHYSICAL EXAM
[General Appearance - Alert] : alert [General Appearance - In No Acute Distress] : in no acute distress [General Appearance - Well Nourished] : well nourished [General Appearance - Well Developed] : well developed [Sclera] : the sclera and conjunctiva were normal [PERRL With Normal Accommodation] : pupils were equal in size, round, and reactive to light [Extraocular Movements] : extraocular movements were intact [Strabismus] : no strabismus was seen [Normal Oral Mucosa] : normal oral mucosa [No Oral Pallor] : no oral pallor [Outer Ear] : the ears and nose were normal in appearance [Both Tympanic Membranes Were Examined] : both tympanic membranes were normal [Neck Appearance] : the appearance of the neck was normal [] : no respiratory distress [Respiration, Rhythm And Depth] : normal respiratory rhythm and effort [Exaggerated Use Of Accessory Muscles For Inspiration] : no accessory muscle use [Auscultation Breath Sounds / Voice Sounds] : lungs were clear to auscultation bilaterally [Apical Impulse] : the apical impulse was normal [Heart Rate And Rhythm] : heart rate was normal and rhythm regular [Bowel Sounds] : normal bowel sounds [Abdomen Soft] : soft [Abdomen Tenderness] : non-tender [No CVA Tenderness] : no ~M costovertebral angle tenderness [No Spinal Tenderness] : no spinal tenderness [Musculoskeletal - Swelling] : no joint swelling seen [FreeTextEntry1] : AO x 1 (person)

## 2020-10-29 NOTE — HISTORY OF PRESENT ILLNESS
[Severe] : Stage: Severe [Worse] : Status: Worse [Memory Lapses Or Loss] : worsened memory impairment [Patient Observed To Be Agitated] : stable agitation [Hostility Toward Caregivers] : stable aggression [Sleep Disturbances] : worsened sleep disturbances [] : stable wandering [Fixed Beliefs Contradicted By Reality (Delusions)] : stable delusions [Difficulty Finding Desired Words] : worsened difficulty finding desired words [0] : 2) Feeling down, depressed, or hopeless: Not at all [FreeTextEntry1] : Mrs. Ferrara is a 89F from home, lives with her daughter Aisha who is her main caretaker. Has a hx of dementia, HTN, GERD, OP presented for an acute visit due to worsening rash in her groin and back, related to moisture from urinary incontinence. \par \par Pt is incontinent of urine and needs frequent diaper change, daughter reported this is particularly difficult at night as pt urinates a lot. Pt was prescribed Nystatin cream but reported was not covered by her insurance, therefore was not able to get it. She has been applying barrier cream and some other creams (from her family members, steroid cream?) in hopes of improving rash. She reported rash is slightly better but wanted to bring her mother for more recommendations. \par \par Daughter also reported pt has been complaining of a lot of pain on her left shoulder associated with limited range of motion, has been giving Tylenol with some improvement, denies any falls. \par \par She has also noticed, pt has been more weak at home, sleeping more during the day. She reported continues to have a good appetite and eat lot during the day. She was recently treated for a left eye infection as her daughter has noticed pt rubs her eyes frequently.

## 2020-11-05 ENCOUNTER — RX RENEWAL (OUTPATIENT)
Age: 85
End: 2020-11-05

## 2020-12-04 ENCOUNTER — LABORATORY RESULT (OUTPATIENT)
Age: 85
End: 2020-12-04

## 2020-12-23 PROBLEM — J06.9 VIRAL URI WITH COUGH: Status: RESOLVED | Noted: 2020-04-09 | Resolved: 2020-12-23

## 2021-01-21 ENCOUNTER — APPOINTMENT (OUTPATIENT)
Dept: GERIATRICS | Facility: CLINIC | Age: 86
End: 2021-01-21
Payer: MEDICARE

## 2021-01-21 DIAGNOSIS — R09.82 POSTNASAL DRIP: ICD-10-CM

## 2021-01-21 DIAGNOSIS — M17.0 BILATERAL PRIMARY OSTEOARTHRITIS OF KNEE: ICD-10-CM

## 2021-01-21 PROCEDURE — 99212 OFFICE O/P EST SF 10 MIN: CPT | Mod: 95

## 2021-01-21 RX ORDER — NITROFURANTOIN (MONOHYDRATE/MACROCRYSTALS) 25; 75 MG/1; MG/1
100 CAPSULE ORAL
Qty: 10 | Refills: 0 | Status: DISCONTINUED | COMMUNITY
Start: 2020-12-07 | End: 2021-01-21

## 2021-01-21 NOTE — REVIEW OF SYSTEMS
[Fever] : no fever [Incontinence] : incontinence [Joint Pain] : no joint pain [FreeTextEntry6] : cough at night [FreeTextEntry7] : loose bm today [FreeTextEntry8] : f

## 2021-01-21 NOTE — HISTORY OF PRESENT ILLNESS
[FreeTextEntry1] : 89F with dementia, HTN, GERD, osteoporosis, OA presenting for follow up with her dtr via telehealth.\par \par 1 day complaining of right hand and right knee pain.  noted to have some swelling of the right knee as well\par they used icy hot and warm compress, and tylenol\par denies trauma or bruising\par \par \par earlier this week she had foul smelling urine, and 1 episode of loose bm.\par eating okay\par \par drank a lot yesterday\par \par bp: denies lightheadedness, dizziness, shortness of breath, or palpitations.  adherent with medications\par \par sometimes with difficulty taking her pills, \par \par UI:  no bleeding.  wears diapers\par also with fecal incontinence [Severe] : Stage: Severe [Worse] : Status: Worse [Memory Lapses Or Loss] : stable memory impairment [Patient Observed To Be Agitated] : stable agitation [Sleep Disturbances] : denies sleep disturbances [] : denies wandering [Fixed Beliefs Contradicted By Reality (Delusions)] : denies delusions [Difficulty Finding Desired Words] : denies difficulty finding desired words [de-identified] : no longer recognizes family, denies dysphagia [Home] : at home, [unfilled] , at the time of the visit. [Medical Office: ([facility])___] : at the medical office located in  [Family Member] : family member [FreeTextEntry3] : JAYLENE Leonard

## 2021-02-12 ENCOUNTER — RX RENEWAL (OUTPATIENT)
Age: 86
End: 2021-02-12

## 2021-07-15 ENCOUNTER — RX RENEWAL (OUTPATIENT)
Age: 86
End: 2021-07-15

## 2021-09-13 ENCOUNTER — APPOINTMENT (OUTPATIENT)
Dept: GERIATRICS | Facility: CLINIC | Age: 86
End: 2021-09-13

## 2021-09-15 ENCOUNTER — APPOINTMENT (OUTPATIENT)
Dept: GERIATRICS | Facility: CLINIC | Age: 86
End: 2021-09-15
Payer: MEDICARE

## 2021-09-15 VITALS
DIASTOLIC BLOOD PRESSURE: 70 MMHG | RESPIRATION RATE: 15 BRPM | OXYGEN SATURATION: 98 % | HEIGHT: 62 IN | BODY MASS INDEX: 23.42 KG/M2 | WEIGHT: 127.25 LBS | SYSTOLIC BLOOD PRESSURE: 110 MMHG | HEART RATE: 69 BPM

## 2021-09-15 DIAGNOSIS — Z86.73 PERSONAL HISTORY OF TRANSIENT ISCHEMIC ATTACK (TIA), AND CEREBRAL INFARCTION W/OUT RESIDUAL DEFICITS: ICD-10-CM

## 2021-09-15 PROCEDURE — 99214 OFFICE O/P EST MOD 30 MIN: CPT | Mod: GC

## 2021-09-15 PROCEDURE — 99497 ADVNCD CARE PLAN 30 MIN: CPT | Mod: GC,25

## 2021-09-15 RX ORDER — MELATONIN 3 MG
3 CAPSULE ORAL
Refills: 0 | Status: ACTIVE | COMMUNITY
Start: 2021-09-15

## 2021-09-15 RX ORDER — NYSTATIN 100000 1/G
100000 POWDER TOPICAL
Qty: 2 | Refills: 3 | Status: ACTIVE | COMMUNITY
Start: 2020-10-29 | End: 1900-01-01

## 2021-09-15 NOTE — END OF VISIT
[] : Fellow [FreeTextEntry3] : 89yo F with severe dementia.  AGree with plan as documented above.  need to f/u ACP discussion with family meeting

## 2021-09-15 NOTE — HISTORY OF PRESENT ILLNESS
[With Patient/Caregiver] : , with patient/caregiver [Reviewed updated] : Reviewed, updated [I will adhere to the patient's wishes.] : I will adhere to the patient's wishes. [Time Spent: ___ minutes] : Time Spent: [unfilled] minutes [Completely Dependent] : Completely dependent. [Walker] : walker [Severe] : Stage: Severe [Worse] : Status: Worse [FreeTextEntry1] : 89 yo F with advanced dementia, HTN, GERD, osteoporosis, OA presenting for follow up with her daughter Aisha.\par \par Pt was hospitalized for a week at the end of August for new weakness, appeared to have had a stroke. Now has worsening weakness and difficulty with ADLs and requires full assistance with transferring and though is ambulating with a walker, requires assistance with that as well. Had worsening dysphagia as result of CVA but is able to eat with some assistance. Currently getting PT and speech therapy at home with some improvement in ambulation. [AdvancecareDate] : 09/21 [FreeTextEntry4] : Zainab Landeros, daughter, is primary HCP\par Discussed with 2 daughters pt's prognosis given underlying advanced dementia and new CVA. Discussed that she is at high risk of developing dysphagia 2/2 dementia and repeated CVA but that feeding tube would not be likely to increase lifespan and brings with it risk of complications. Also discussed DNR/DNI and that given her poor functional status and advanced dementia, she would be unlikely to do well on a ventilator or if resuscitated. Daughters said they have started discussions but not yet ready to make decision. Plan to follow-up with us for a family meeting to fill out MOLST at that time.

## 2021-09-15 NOTE — PHYSICAL EXAM
[Normal] : heart rate was normal and rhythm regular, normal S1 and S2 [Edema] : edema was not present [Abdomen Tenderness] : non-tender [No Masses] : no abdominal mass palpated [Abdomen Soft] : soft [de-identified] : Wheelchair bound [de-identified] : Non-verbal

## 2021-10-21 ENCOUNTER — RX RENEWAL (OUTPATIENT)
Age: 86
End: 2021-10-21

## 2021-11-03 ENCOUNTER — APPOINTMENT (OUTPATIENT)
Dept: GERIATRICS | Facility: CLINIC | Age: 86
End: 2021-11-03
Payer: MEDICARE

## 2021-11-03 VITALS
HEART RATE: 85 BPM | SYSTOLIC BLOOD PRESSURE: 110 MMHG | OXYGEN SATURATION: 98 % | TEMPERATURE: 98.1 F | DIASTOLIC BLOOD PRESSURE: 70 MMHG | RESPIRATION RATE: 16 BRPM | HEIGHT: 62 IN | WEIGHT: 125.38 LBS | BODY MASS INDEX: 23.07 KG/M2

## 2021-11-03 DIAGNOSIS — L89.90 PRESSURE ULCER OF UNSPECIFIED SITE, UNSPECIFIED STAGE: ICD-10-CM

## 2021-11-03 PROCEDURE — 99214 OFFICE O/P EST MOD 30 MIN: CPT

## 2021-11-03 NOTE — PHYSICAL EXAM
[Normal] : heart rate was normal and rhythm regular, normal S1 and S2 [Edema] : edema was not present [Abdomen Tenderness] : non-tender [No Masses] : no abdominal mass palpated [Abdomen Soft] : soft [de-identified] : approx 5inch x 3inch persistant redness with small .5in opening on left buttock [de-identified] : Wheelchair bound [de-identified] : Non-verbal

## 2021-11-03 NOTE — HISTORY OF PRESENT ILLNESS
[Completely Dependent] : Completely dependent. [Walker] : walker [Severe] : Stage: Severe [Worse] : Status: Worse [With Patient/Caregiver] : , with patient/caregiver [Reviewed updated] : Reviewed, updated [FreeTextEntry1] : 89 yo F with advanced dementia, HTN, GERD, osteoporosis, OA presenting for follow up with her daughter Aisha.\par was supposed to have family meeting today, but sister was unable to join.\par presents for follow up. arrived ~35min late\par \par hospitalized August 2021 for new weakness, appeared to have had a stroke.\par  Now has worsening weakness and difficulty with ADLs and requires full assistance with transferring and though is ambulating with walker for only short distances, she still requires 1 person to assist her when she ambulates due to weakness and falls.  she requires a wheelchair for any longer distances due to her residual weakness .\par PT ended\par pt still resistant to walking\par \par normal BM;s\par  eating okay\par \par notes some coughing, with runny nose\par \par \par  [AdvancecareDate] : 09/21 [FreeTextEntry4] : Zainab Landeros, daughter, is primary HCP\par Discussed with 2 daughters pt's prognosis given underlying advanced dementia and new CVA. Discussed that she is at high risk of developing dysphagia 2/2 dementia and repeated CVA but that feeding tube would not be likely to increase lifespan and brings with it risk of complications. Also discussed DNR/DNI and that given her poor functional status and advanced dementia, she would be unlikely to do well on a ventilator or if resuscitated. Daughters said they have started discussions but not yet ready to make decision. Plan to follow-up with us for a family meeting to fill out MOLST at that time.

## 2021-11-03 NOTE — ASSESSMENT
[FreeTextEntry1] : last visit discussed acp with amaury, will schedule a telephonic for when sister Zainab will be able to join in on the conversation.\par

## 2022-01-27 ENCOUNTER — NON-APPOINTMENT (OUTPATIENT)
Age: 87
End: 2022-01-27

## 2022-01-28 ENCOUNTER — NON-APPOINTMENT (OUTPATIENT)
Age: 87
End: 2022-01-28

## 2022-01-28 ENCOUNTER — APPOINTMENT (OUTPATIENT)
Dept: ORTHOPEDIC SURGERY | Facility: CLINIC | Age: 87
End: 2022-01-28
Payer: MEDICARE

## 2022-01-28 VITALS — HEIGHT: 62 IN | WEIGHT: 125 LBS | BODY MASS INDEX: 23 KG/M2

## 2022-01-28 DIAGNOSIS — M25.572 PAIN IN LEFT ANKLE AND JOINTS OF LEFT FOOT: ICD-10-CM

## 2022-01-28 DIAGNOSIS — I99.8 OTHER DISORDER OF CIRCULATORY SYSTEM: ICD-10-CM

## 2022-01-28 DIAGNOSIS — M21.6X2 OTHER ACQUIRED DEFORMITIES OF LEFT FOOT: ICD-10-CM

## 2022-01-28 PROCEDURE — 99203 OFFICE O/P NEW LOW 30 MIN: CPT

## 2022-01-28 PROCEDURE — 73610 X-RAY EXAM OF ANKLE: CPT | Mod: LT

## 2022-01-29 PROBLEM — M21.6X2 GASTROCNEMIUS EQUINUS OF LEFT LOWER EXTREMITY: Status: ACTIVE | Noted: 2022-01-29

## 2022-02-07 RX ORDER — DICLOFENAC SODIUM 1% 10 MG/G
1 GEL TOPICAL
Qty: 1 | Refills: 2 | Status: ACTIVE | COMMUNITY
Start: 2022-02-07 | End: 1900-01-01

## 2022-02-11 ENCOUNTER — APPOINTMENT (OUTPATIENT)
Dept: ORTHOPEDIC SURGERY | Facility: CLINIC | Age: 87
End: 2022-02-11
Payer: MEDICARE

## 2022-02-11 DIAGNOSIS — M24.9 JOINT DERANGEMENT, UNSPECIFIED: ICD-10-CM

## 2022-02-11 DIAGNOSIS — R60.0 LOCALIZED EDEMA: ICD-10-CM

## 2022-02-11 PROCEDURE — 99213 OFFICE O/P EST LOW 20 MIN: CPT

## 2022-02-14 ENCOUNTER — RX RENEWAL (OUTPATIENT)
Age: 87
End: 2022-02-14

## 2022-02-18 RX ORDER — GUAIFENESIN 600 MG/1
600 TABLET, EXTENDED RELEASE ORAL
Qty: 14 | Refills: 1 | Status: DISCONTINUED | COMMUNITY
Start: 2020-04-09 | End: 2022-02-18

## 2022-02-18 RX ORDER — ACETAMINOPHEN 325 MG/1
325 TABLET, FILM COATED ORAL
Qty: 90 | Refills: 3 | Status: DISCONTINUED | COMMUNITY
Start: 2018-01-22 | End: 2022-02-18

## 2022-03-16 ENCOUNTER — APPOINTMENT (OUTPATIENT)
Dept: ORTHOPEDIC SURGERY | Facility: CLINIC | Age: 87
End: 2022-03-16

## 2022-03-16 ENCOUNTER — NON-APPOINTMENT (OUTPATIENT)
Age: 87
End: 2022-03-16

## 2022-03-18 ENCOUNTER — APPOINTMENT (OUTPATIENT)
Dept: GERIATRICS | Facility: CLINIC | Age: 87
End: 2022-03-18
Payer: MEDICARE

## 2022-03-18 VITALS
BODY MASS INDEX: 4.62 KG/M2 | SYSTOLIC BLOOD PRESSURE: 138 MMHG | RESPIRATION RATE: 16 BRPM | WEIGHT: 25.13 LBS | TEMPERATURE: 98.1 F | HEIGHT: 62 IN | OXYGEN SATURATION: 97 % | DIASTOLIC BLOOD PRESSURE: 72 MMHG | HEART RATE: 70 BPM

## 2022-03-18 PROCEDURE — 99214 OFFICE O/P EST MOD 30 MIN: CPT

## 2022-03-18 NOTE — HISTORY OF PRESENT ILLNESS
[FreeTextEntry1] : 89 yo F with advanced dementia, HTN, GERD, osteoporosis, OA presenting for follow up with her daughter Aisha.\par \par Severe/advanced dementia\par hospitalized August 2021 for new weakness, appeared to have had a stroke.\par completely dependent with ADL's and IADL's\par they are now blending food because she is just playing with her food\par weight has been stable\par no trouble swallowing pills, she can hide in her mouth so they have to watch her\par no dysphagia\par \par has been following with ortho for Left ankle pain\par has been using pain cream\par \par UI: no bleeding. wears diapers\par also with fecal incontinence \par \par had a cough last week, but now improved.  no fever or lethargy.\par improved after giving albuterol\par \par no recent falls\par using walker for short distance with 1 person there to help\par using wheelchair\par \par GERD: has been taking pantoprazole only prn

## 2022-03-18 NOTE — PHYSICAL EXAM
[Alert] : alert [Sclera] : the sclera and conjunctiva were normal [EOMI] : extraocular movements were intact [Normal Appearance] : the appearance of the neck was normal [Normal] : no respiratory distress, no accessory muscle use, normal respiratory rhythm and effort, lungs were clear to auscultation bilaterally [Normal S1, S2] : normal S1 and S2 [Heart Rate And Rhythm] : heart rate was normal and rhythm regular [Edema] : edema was not present [Bowel Sounds] : normal bowel sounds [Abdomen Tenderness] : non-tender [Abdomen Soft] : soft [No Spinal Tenderness] : no spinal tenderness [No Clubbing, Cyanosis] : no clubbing or cyanosis of the fingernails [Involuntary Movements] : no involuntary movements were seen [FreeTextEntry1] : s [de-identified] : wheelchair [de-identified] : quiet, calm

## 2022-05-27 ENCOUNTER — APPOINTMENT (OUTPATIENT)
Dept: GERIATRICS | Facility: CLINIC | Age: 87
End: 2022-05-27
Payer: MEDICARE

## 2022-05-27 DIAGNOSIS — R05.9 COUGH, UNSPECIFIED: ICD-10-CM

## 2022-05-27 PROCEDURE — 99214 OFFICE O/P EST MOD 30 MIN: CPT | Mod: 95

## 2022-05-27 NOTE — ASSESSMENT
[FreeTextEntry1] : cough:\par suspect viral URI\par no fever or sob.\par improving course\par discussed no abx needed at this time for low suspicion for pna\par will check home labwork and covid test\par dtr will try to test home covid test today\par advised c/w mucinex\par if symptoms worsen call back \par f/u with labs next week

## 2022-05-27 NOTE — PHYSICAL EXAM
[Alert] : alert [Sclera] : the sclera and conjunctiva were normal [No Respiratory Distress] : no respiratory distress [No Acc Muscle Use] : no accessory muscle use [Respiration, Rhythm And Depth] : normal respiratory rhythm and effort

## 2022-05-27 NOTE — HISTORY OF PRESENT ILLNESS
[FreeTextEntry1] : 89yoF  with advanced dementia, HTN, GERD, osteoporosis, OA seen for TH acute visit for cough\par  her daughter Aisha.\par \par coughing started monday, assocaited with weakness and difficulty ambulating\par no fever or diarrhea\par then on tuesday was doing better.\par Aisha started mucinex with improvement\par \par eating and drinking fine\par no sob no fever\par \par no sick contacts\par \par covid boosted\par \par  [Home] : at home, [unfilled] , at the time of the visit. [Medical Office: ([facility])___] : at the medical office located in  [Family Member] : family member [FreeTextEntry3] : ruth Leonard

## 2022-06-03 LAB
ALBUMIN SERPL ELPH-MCNC: 3.6 G/DL
ALP BLD-CCNC: 645 U/L
ALT SERPL-CCNC: 38 U/L
ANION GAP SERPL CALC-SCNC: 11 MMOL/L
AST SERPL-CCNC: 70 U/L
BASOPHILS # BLD AUTO: 0.03 K/UL
BASOPHILS NFR BLD AUTO: 0.3 %
BILIRUB SERPL-MCNC: 0.5 MG/DL
BUN SERPL-MCNC: 16 MG/DL
CALCIUM SERPL-MCNC: 8.8 MG/DL
CHLORIDE SERPL-SCNC: 102 MMOL/L
CO2 SERPL-SCNC: 26 MMOL/L
CREAT SERPL-MCNC: 0.64 MG/DL
EGFR: 80 ML/MIN/1.73M2
EOSINOPHIL # BLD AUTO: 0.14 K/UL
EOSINOPHIL NFR BLD AUTO: 1.6 %
GLUCOSE SERPL-MCNC: 97 MG/DL
HCT VFR BLD CALC: 38.4 %
HGB BLD-MCNC: 11.7 G/DL
IMM GRANULOCYTES NFR BLD AUTO: 0.5 %
LYMPHOCYTES # BLD AUTO: 2.12 K/UL
LYMPHOCYTES NFR BLD AUTO: 24.4 %
MAN DIFF?: NORMAL
MCHC RBC-ENTMCNC: 29.9 PG
MCHC RBC-ENTMCNC: 30.5 GM/DL
MCV RBC AUTO: 98.2 FL
MONOCYTES # BLD AUTO: 0.41 K/UL
MONOCYTES NFR BLD AUTO: 4.7 %
NEUTROPHILS # BLD AUTO: 5.95 K/UL
NEUTROPHILS NFR BLD AUTO: 68.5 %
PLATELET # BLD AUTO: 325 K/UL
POTASSIUM SERPL-SCNC: 4.9 MMOL/L
PROT SERPL-MCNC: 7 G/DL
RBC # BLD: 3.91 M/UL
RBC # FLD: 14.3 %
SODIUM SERPL-SCNC: 138 MMOL/L
WBC # FLD AUTO: 8.69 K/UL

## 2022-06-13 ENCOUNTER — NON-APPOINTMENT (OUTPATIENT)
Age: 87
End: 2022-06-13

## 2022-07-08 ENCOUNTER — APPOINTMENT (OUTPATIENT)
Dept: GERIATRICS | Facility: CLINIC | Age: 87
End: 2022-07-08

## 2022-07-08 VITALS
DIASTOLIC BLOOD PRESSURE: 62 MMHG | SYSTOLIC BLOOD PRESSURE: 130 MMHG | HEART RATE: 68 BPM | TEMPERATURE: 98.4 F | BODY MASS INDEX: 21.95 KG/M2 | RESPIRATION RATE: 16 BRPM | WEIGHT: 120 LBS | OXYGEN SATURATION: 97 %

## 2022-07-08 DIAGNOSIS — Z71.89 OTHER SPECIFIED COUNSELING: ICD-10-CM

## 2022-07-08 DIAGNOSIS — K21.9 GASTRO-ESOPHAGEAL REFLUX DISEASE W/OUT ESOPHAGITIS: ICD-10-CM

## 2022-07-08 PROCEDURE — 99215 OFFICE O/P EST HI 40 MIN: CPT

## 2022-07-11 PROBLEM — K21.9 CHRONIC GERD: Status: ACTIVE | Noted: 2018-01-22

## 2022-07-11 PROBLEM — Z71.89 ADVANCED CARE PLANNING/COUNSELING DISCUSSION: Status: ACTIVE | Noted: 2021-09-15

## 2022-07-11 NOTE — ASSESSMENT
[FreeTextEntry1] : Follow up visit scheduled with Dr. Alicia in October; plan for family meeting at that time\par \par CAT Marquez-BC

## 2022-07-11 NOTE — HISTORY OF PRESENT ILLNESS
[FreeTextEntry1] : TAMIR RINALDI is a 89 yo woman with a PMH of dementia, HTN, GERD, osteoporosis, and OA who presents today for follow up visit after hospitalization in June. Patient is accompanied by daughter, Aisha who provided collateral history. \par \par Hospitalization course:\par -admitted to [facility] from 6/3-6/8 for UTI and COVID-19 \par -patient received IV antibiotics \par -discharged to home with daughter\par -did not come with records to review\par \par Severe dementia:\par -patient has 24/7 care \par -lives with daughter\par -completely dependent with ADLs and IADLs \par -5 pound weight loss since 3/2022\par -behaviors stable, occasionally agitated with care but never violent\par -still walking with walker with 1-2 person assist; no recent falls\par -occasionally pocketing pills; always has supervision to ensure that medications have been swallowed\par -no coughing with meals/fluids\par -taking melatonin 3 mg which has helped a lot with sleep; prior to melatonin patient awake all night talking\par \par GERD:\par -on pantoprazole \par \par HTN:\par -on metoprolol \par -on valsartan\par \par Hx CVA/HLD:\par -on atorvastatin\par -on aspirin 81 mg \par \par Advanced care planning:\par -daughterZainab (lives in [place]) is primary HCP\par -unclear who seconday HCP is; form is not on file \par -previous GOC discussions have been had with Dr. Alicia, who discussed patient's poor prognosis if resuscitated/placed on ventilator; at the time of the discussion, daughters were not ready to make a decision for MOLST\par -daughter Aisha states that her and Zainab (HCP) are not aligned in decision making for patient\par  [FreeTextEntry4] : -daughter, Zainab Landeros (lives in [place]) is primary HCP\par -unclear who seconday HCP is; form is not on file \par -previous GOC discussions have been had with Dr. Alicia, who discussed patient's poor prognosis if resuscitated/placed on ventilator; at the time of the discussion, daughters were not ready to make a decision for MOLST\par -daughter Aisha states that her and Zainab (HCP) are not aligned in decision making for patient\par -discussed that HCP form should be obtained and brought in so it can be scanned into patient's chart\par -discussed importance of GOC discussion/family meeting in the context of severe dementia and other comorbidities

## 2022-07-11 NOTE — PHYSICAL EXAM
[Alert] : alert [No Acute Distress] : in no acute distress [Sclera] : the sclera and conjunctiva were normal [EOMI] : extraocular movements were intact [PERRL] : pupils were equal in size, round, and reactive to light [Normal Oral Mucosa] : normal oral mucosa [Normal Outer Ear/Nose] : the ears and nose were normal in appearance [Both Tympanic Membranes Were Examined] : both tympanic membranes were normal [Normal Appearance] : the appearance of the neck was normal [Supple] : the neck was supple [No Respiratory Distress] : no respiratory distress [No Acc Muscle Use] : no accessory muscle use [Respiration, Rhythm And Depth] : normal respiratory rhythm and effort [Auscultation Breath Sounds / Voice Sounds] : lungs were clear to auscultation bilaterally [Normal S1, S2] : normal S1 and S2 [Heart Rate And Rhythm] : heart rate was normal and rhythm regular [Edema] : edema was not present [Pedal Pulses Normal] : the pedal pulses are present [Bowel Sounds] : normal bowel sounds [Abdomen Tenderness] : non-tender [Abdomen Soft] : soft [Cervical Lymph Nodes Enlarged Posterior Bilaterally] : posterior cervical [Supraclavicular Lymph Nodes Enlarged Bilaterally] : supraclavicular [Cervical Lymph Nodes Enlarged Anterior Bilaterally] : anterior cervical, supraclavicular [Axillary Lymph Nodes Enlarged Bilaterally] : axillary [No CVA Tenderness] : no CVA  tenderness [No Spinal Tenderness] : no spinal tenderness [No Focal Deficits] : no focal deficits [Normal Affect] : the affect was normal [Normal Mood] : the mood was normal [Normal Gait] : abnormal gait [Normal Insight/Judgment] : insight and judgment were not intact

## 2022-10-21 ENCOUNTER — APPOINTMENT (OUTPATIENT)
Dept: GERIATRICS | Facility: CLINIC | Age: 87
End: 2022-10-21

## 2022-10-21 VITALS
DIASTOLIC BLOOD PRESSURE: 60 MMHG | BODY MASS INDEX: 22.78 KG/M2 | SYSTOLIC BLOOD PRESSURE: 130 MMHG | OXYGEN SATURATION: 97 % | WEIGHT: 125.4 LBS | HEIGHT: 62.4 IN | RESPIRATION RATE: 18 BRPM | HEART RATE: 76 BPM

## 2022-10-21 DIAGNOSIS — R26.81 UNSTEADINESS ON FEET: ICD-10-CM

## 2022-10-21 DIAGNOSIS — I10 ESSENTIAL (PRIMARY) HYPERTENSION: ICD-10-CM

## 2022-10-21 DIAGNOSIS — F03.90 UNSPECIFIED DEMENTIA W/OUT BEHAVIORAL DISTURBANCE: ICD-10-CM

## 2022-10-21 DIAGNOSIS — Z86.73 PERSONAL HISTORY OF TRANSIENT ISCHEMIC ATTACK (TIA), AND CEREBRAL INFARCTION W/OUT RESIDUAL DEFICITS: ICD-10-CM

## 2022-10-21 DIAGNOSIS — R32 UNSPECIFIED URINARY INCONTINENCE: ICD-10-CM

## 2022-10-21 PROCEDURE — G0008: CPT

## 2022-10-21 PROCEDURE — 90662 IIV NO PRSV INCREASED AG IM: CPT

## 2022-10-21 PROCEDURE — 99214 OFFICE O/P EST MOD 30 MIN: CPT

## 2022-10-21 RX ORDER — ZINC OXIDE 22 G/100G
22 CREAM TOPICAL
Qty: 4 | Refills: 4 | Status: ACTIVE | COMMUNITY
Start: 2022-10-21 | End: 1900-01-01

## 2022-10-21 NOTE — PHYSICAL EXAM
[Normal] : alert, in no acute distress [Sclera] : the sclera and conjunctiva were normal [EOMI] : extraocular movements were intact [Normal Outer Ear/Nose] : the ears and nose were normal in appearance [Normal Appearance] : the appearance of the neck was normal [No Respiratory Distress] : no respiratory distress [No Acc Muscle Use] : no accessory muscle use [Auscultation Breath Sounds / Voice Sounds] : lungs were clear to auscultation bilaterally [Respiration, Rhythm And Depth] : normal respiratory rhythm and effort [Normal S1, S2] : normal S1 and S2 [Heart Rate And Rhythm] : heart rate was normal and rhythm regular [Edema] : edema was not present [Abdomen Tenderness] : non-tender [Abdomen Soft] : soft [No Clubbing, Cyanosis] : no clubbing or cyanosis of the fingernails [Involuntary Movements] : no involuntary movements were seen [de-identified] : wheelchair [de-identified] : calm

## 2022-10-21 NOTE — HISTORY OF PRESENT ILLNESS
[FreeTextEntry1] : 89 yo woman with a PMH of dementia, HTN, GERD, osteoporosis, seen for f/u with ruth Leonard who provided collateral history. \par \par Severe dementia:\par -patient has 24/7 care \par -lives with daughter\par -completely dependent with ADLs and IADLs \par -behaviors stable, no hallucinations\par -still walking with walker with 1-2 person assist; no recent falls\par -taking melatonin 3 mg which has helped a lot with sleep; prior to melatonin patient awake all night talking\par eating well\par \par UI: \par using zinc oxide with good reponse\par \par GERD:\par -on pantoprazole \par \par HTN:\par -on metoprolol \par -on valsartan\par \par Hx CVA/HLD:\par -on atorvastatin\par -on aspirin 81 mg \par \par Advanced care planning:\par -daughterZainab (lives in [place]) is primary HCP\par -unclear who seconday HCP is; form is not on file \par Aisha was unable to get copy since last visit\par \par

## 2022-11-09 RX ORDER — ATORVASTATIN CALCIUM 40 MG/1
40 TABLET, FILM COATED ORAL
Qty: 90 | Refills: 2 | Status: ACTIVE | COMMUNITY
Start: 2021-09-15 | End: 1900-01-01

## 2023-02-17 ENCOUNTER — APPOINTMENT (OUTPATIENT)
Dept: GERIATRICS | Facility: CLINIC | Age: 88
End: 2023-02-17

## 2023-02-24 ENCOUNTER — APPOINTMENT (OUTPATIENT)
Dept: GERIATRICS | Facility: CLINIC | Age: 88
End: 2023-02-24

## 2023-02-27 ENCOUNTER — RX RENEWAL (OUTPATIENT)
Age: 88
End: 2023-02-27

## 2023-03-10 ENCOUNTER — RX RENEWAL (OUTPATIENT)
Age: 88
End: 2023-03-10

## 2023-06-16 ENCOUNTER — RX RENEWAL (OUTPATIENT)
Age: 88
End: 2023-06-16

## 2023-06-16 RX ORDER — METOPROLOL SUCCINATE 25 MG/1
25 TABLET, EXTENDED RELEASE ORAL DAILY
Qty: 90 | Refills: 0 | Status: ACTIVE | COMMUNITY
Start: 2019-10-14 | End: 1900-01-01

## 2023-07-05 ENCOUNTER — RX RENEWAL (OUTPATIENT)
Age: 88
End: 2023-07-05

## 2023-07-05 RX ORDER — VALSARTAN 80 MG/1
80 TABLET, COATED ORAL
Qty: 90 | Refills: 1 | Status: ACTIVE | COMMUNITY
Start: 2019-12-23 | End: 1900-01-01

## 2023-09-16 NOTE — PHYSICAL EXAM
[General Appearance - Alert] : alert [General Appearance - In No Acute Distress] : in no acute distress [Sclera] : the sclera and conjunctiva were normal [Extraocular Movements] : extraocular movements were intact [Neck Appearance] : the appearance of the neck was normal [] : no respiratory distress [Respiration, Rhythm And Depth] : normal respiratory rhythm and effort [Exaggerated Use Of Accessory Muscles For Inspiration] : no accessory muscle use [Edema] : there was no peripheral edema [Abdomen Soft] : soft [Abdomen Tenderness] : non-tender [Nail Clubbing] : no clubbing  or cyanosis of the fingernails [Involuntary Movements] : no involuntary movements were seen [Skin Color & Pigmentation] : normal skin color and pigmentation [No Focal Deficits] : no focal deficits [FreeTextEntry1] : calm, quiet Ataxic gait

## 2024-05-28 NOTE — REVIEW OF SYSTEMS
Form Received:   Delivery Confirmation Request , Form Comments: [employer]   Date Received: MAY, 28,  2024  Date Due (No later than): JUNE, 8, 2024  Patient's next scheduled Appointment: MAY, 29,  2024  Release of Information form filled out: YES    [As Noted in HPI] : as noted in HPI [Negative] : Heme/Lymph